# Patient Record
Sex: FEMALE | Race: ASIAN | NOT HISPANIC OR LATINO | Employment: STUDENT | URBAN - METROPOLITAN AREA
[De-identification: names, ages, dates, MRNs, and addresses within clinical notes are randomized per-mention and may not be internally consistent; named-entity substitution may affect disease eponyms.]

---

## 2023-04-17 ENCOUNTER — LAB VISIT (OUTPATIENT)
Dept: LAB | Facility: HOSPITAL | Age: 23
End: 2023-04-17
Attending: NURSE PRACTITIONER
Payer: COMMERCIAL

## 2023-04-17 ENCOUNTER — OFFICE VISIT (OUTPATIENT)
Dept: OBSTETRICS AND GYNECOLOGY | Facility: CLINIC | Age: 23
End: 2023-04-17
Payer: COMMERCIAL

## 2023-04-17 VITALS
DIASTOLIC BLOOD PRESSURE: 78 MMHG | WEIGHT: 131.63 LBS | SYSTOLIC BLOOD PRESSURE: 116 MMHG | HEIGHT: 60 IN | BODY MASS INDEX: 25.84 KG/M2

## 2023-04-17 DIAGNOSIS — R53.83 FATIGUE, UNSPECIFIED TYPE: ICD-10-CM

## 2023-04-17 DIAGNOSIS — Z12.4 ENCOUNTER FOR SCREENING FOR CERVICAL CANCER: ICD-10-CM

## 2023-04-17 DIAGNOSIS — Z01.419 WELL WOMAN EXAM WITH ROUTINE GYNECOLOGICAL EXAM: Primary | ICD-10-CM

## 2023-04-17 DIAGNOSIS — N92.6 IRREGULAR MENSTRUAL CYCLE: ICD-10-CM

## 2023-04-17 LAB
BASOPHILS # BLD AUTO: 0.03 K/UL (ref 0–0.2)
BASOPHILS NFR BLD: 0.5 % (ref 0–1.9)
DIFFERENTIAL METHOD: NORMAL
EOSINOPHIL # BLD AUTO: 0.4 K/UL (ref 0–0.5)
EOSINOPHIL NFR BLD: 7.3 % (ref 0–8)
ERYTHROCYTE [DISTWIDTH] IN BLOOD BY AUTOMATED COUNT: 12 % (ref 11.5–14.5)
HCT VFR BLD AUTO: 42.5 % (ref 37–48.5)
HGB BLD-MCNC: 13.8 G/DL (ref 12–16)
IMM GRANULOCYTES # BLD AUTO: 0.03 K/UL (ref 0–0.04)
IMM GRANULOCYTES NFR BLD AUTO: 0.5 % (ref 0–0.5)
LYMPHOCYTES # BLD AUTO: 1.1 K/UL (ref 1–4.8)
LYMPHOCYTES NFR BLD: 19.3 % (ref 18–48)
MCH RBC QN AUTO: 27.3 PG (ref 27–31)
MCHC RBC AUTO-ENTMCNC: 32.5 G/DL (ref 32–36)
MCV RBC AUTO: 84 FL (ref 82–98)
MONOCYTES # BLD AUTO: 0.3 K/UL (ref 0.3–1)
MONOCYTES NFR BLD: 5.9 % (ref 4–15)
NEUTROPHILS # BLD AUTO: 3.9 K/UL (ref 1.8–7.7)
NEUTROPHILS NFR BLD: 66.5 % (ref 38–73)
NRBC BLD-RTO: 0 /100 WBC
PLATELET # BLD AUTO: 266 K/UL (ref 150–450)
PMV BLD AUTO: 10.7 FL (ref 9.2–12.9)
RBC # BLD AUTO: 5.05 M/UL (ref 4–5.4)
WBC # BLD AUTO: 5.79 K/UL (ref 3.9–12.7)

## 2023-04-17 PROCEDURE — 3008F PR BODY MASS INDEX (BMI) DOCUMENTED: ICD-10-PCS | Mod: CPTII,S$GLB,, | Performed by: NURSE PRACTITIONER

## 2023-04-17 PROCEDURE — 84443 ASSAY THYROID STIM HORMONE: CPT | Performed by: NURSE PRACTITIONER

## 2023-04-17 PROCEDURE — 3078F DIAST BP <80 MM HG: CPT | Mod: CPTII,S$GLB,, | Performed by: NURSE PRACTITIONER

## 2023-04-17 PROCEDURE — 1159F PR MEDICATION LIST DOCUMENTED IN MEDICAL RECORD: ICD-10-PCS | Mod: CPTII,S$GLB,, | Performed by: NURSE PRACTITIONER

## 2023-04-17 PROCEDURE — 3074F PR MOST RECENT SYSTOLIC BLOOD PRESSURE < 130 MM HG: ICD-10-PCS | Mod: CPTII,S$GLB,, | Performed by: NURSE PRACTITIONER

## 2023-04-17 PROCEDURE — 1160F PR REVIEW ALL MEDS BY PRESCRIBER/CLIN PHARMACIST DOCUMENTED: ICD-10-PCS | Mod: CPTII,S$GLB,, | Performed by: NURSE PRACTITIONER

## 2023-04-17 PROCEDURE — 99999 PR PBB SHADOW E&M-NEW PATIENT-LVL III: CPT | Mod: PBBFAC,,, | Performed by: NURSE PRACTITIONER

## 2023-04-17 PROCEDURE — 88175 CYTOPATH C/V AUTO FLUID REDO: CPT | Performed by: NURSE PRACTITIONER

## 2023-04-17 PROCEDURE — 99385 PREV VISIT NEW AGE 18-39: CPT | Mod: S$GLB,,, | Performed by: NURSE PRACTITIONER

## 2023-04-17 PROCEDURE — 99999 PR PBB SHADOW E&M-NEW PATIENT-LVL III: ICD-10-PCS | Mod: PBBFAC,,, | Performed by: NURSE PRACTITIONER

## 2023-04-17 PROCEDURE — 36415 COLL VENOUS BLD VENIPUNCTURE: CPT | Performed by: NURSE PRACTITIONER

## 2023-04-17 PROCEDURE — 1159F MED LIST DOCD IN RCRD: CPT | Mod: CPTII,S$GLB,, | Performed by: NURSE PRACTITIONER

## 2023-04-17 PROCEDURE — 85025 COMPLETE CBC W/AUTO DIFF WBC: CPT | Performed by: NURSE PRACTITIONER

## 2023-04-17 PROCEDURE — 1160F RVW MEDS BY RX/DR IN RCRD: CPT | Mod: CPTII,S$GLB,, | Performed by: NURSE PRACTITIONER

## 2023-04-17 PROCEDURE — 3074F SYST BP LT 130 MM HG: CPT | Mod: CPTII,S$GLB,, | Performed by: NURSE PRACTITIONER

## 2023-04-17 PROCEDURE — 84439 ASSAY OF FREE THYROXINE: CPT | Performed by: NURSE PRACTITIONER

## 2023-04-17 PROCEDURE — 3008F BODY MASS INDEX DOCD: CPT | Mod: CPTII,S$GLB,, | Performed by: NURSE PRACTITIONER

## 2023-04-17 PROCEDURE — 83036 HEMOGLOBIN GLYCOSYLATED A1C: CPT | Performed by: NURSE PRACTITIONER

## 2023-04-17 PROCEDURE — 83525 ASSAY OF INSULIN: CPT | Performed by: NURSE PRACTITIONER

## 2023-04-17 PROCEDURE — 3078F PR MOST RECENT DIASTOLIC BLOOD PRESSURE < 80 MM HG: ICD-10-PCS | Mod: CPTII,S$GLB,, | Performed by: NURSE PRACTITIONER

## 2023-04-17 PROCEDURE — 99385 PR PREVENTIVE VISIT,NEW,18-39: ICD-10-PCS | Mod: S$GLB,,, | Performed by: NURSE PRACTITIONER

## 2023-04-17 RX ORDER — CYANOCOBALAMIN (VITAMIN B-12) 500 MCG
TABLET ORAL
COMMUNITY

## 2023-04-17 RX ORDER — IVERMECTIN 10 MG/G
CREAM TOPICAL
COMMUNITY
Start: 2023-03-27

## 2023-04-17 RX ORDER — ASCORBIC ACID 250 MG
TABLET,CHEWABLE ORAL
COMMUNITY

## 2023-04-17 NOTE — PROGRESS NOTES
Subjective:       Patient ID: Luciana Oneil is a 22 y.o. female.    Chief Complaint:  Annual Exam    Patient's last menstrual period was 2023 (exact date).  History of Present Illness  Annual Exam-Premenopausal  Patient presents for annual exam. The patient is sexually active. GYN screening history: no prior history of gyn screening tests. The patient wears seatbelts: yes. The patient participates in regular exercise: yes. Has the patient ever been transfused or tattooed?: no. The patient reports that there is not domestic violence in her life.  Patient has complaints of increased fatigue over the last year.  Patient notices fatigue worsened significantly after meals.  Patient with long history of irregular menstrual cycles.  Patient reports trial of birth control around 3 years ago but discontinued due to mood changes with use.  Patient reports sometimes cycles are monthly, sometimes skips months between. Declines STD screening.     OB History    Para Term  AB Living   0 0 0 0 0 0   SAB IAB Ectopic Multiple Live Births   0 0 0 0 0       Review of Systems  Review of Systems   Constitutional:  Positive for fatigue. Negative for appetite change, fever and unexpected weight change.   Eyes:  Negative for visual disturbance.   Cardiovascular:  Negative for chest pain.   Gastrointestinal:  Negative for abdominal pain, bloating, constipation, diarrhea, nausea and vomiting.   Genitourinary:  Positive for menstrual problem. Negative for bladder incontinence, dysmenorrhea, dyspareunia, dysuria, flank pain, frequency, genital sores, menorrhagia, pelvic pain, urgency, vaginal bleeding, vaginal discharge, vaginal pain, postcoital bleeding, vaginal dryness and vaginal odor.   Integumentary:  Negative for rash, acne, mole/lesion, breast mass, nipple discharge, breast skin changes and breast tenderness.   Neurological:  Negative for syncope and headaches.   Hematological:  Negative for adenopathy. Does not  bruise/bleed easily.   All other systems reviewed and are negative.  Breast: Positive for breast self exam.Negative for asymmetry, lump, mass, nipple discharge, skin changes and tenderness         Objective:      Physical Exam:   Constitutional: She is oriented to person, place, and time. She appears well-developed and well-nourished.    HENT:   Head: Normocephalic and atraumatic.    Eyes: Pupils are equal, round, and reactive to light. Conjunctivae and EOM are normal.     Cardiovascular:  Normal rate and regular rhythm.             Pulmonary/Chest: Effort normal. Right breast exhibits no inverted nipple, no mass, no nipple discharge, no skin change, no tenderness, no bleeding and no swelling. Left breast exhibits no inverted nipple, no mass, no nipple discharge, no skin change, no tenderness, no bleeding and no swelling. Breasts are symmetrical.        Abdominal: Soft. Hernia confirmed negative in the right inguinal area and confirmed negative in the left inguinal area.     Genitourinary:    Inguinal canal, vagina, uterus, right adnexa, left adnexa and rectum normal.      Pelvic exam was performed with patient supine.   The external female genitalia was normal.   Genitalia hair distrobution normal .   Labial bartholins normal.There is no rash, tenderness, lesion or injury on the right labia. There is no rash, tenderness, lesion or injury on the left labia. Cervix is normal. Right adnexum displays no mass, no tenderness and no fullness. Left adnexum displays no mass, no tenderness and no fullness. No erythema,  no vaginal discharge, bleeding, rectocele, cystocele or unspecified prolapse of vaginal walls in the vagina. Cervix exhibits no motion tenderness and no friability. Uterus is not tender. Normal urethral meatus.          Musculoskeletal: Normal range of motion and moves all extremeties.      Lymphadenopathy: No inguinal adenopathy noted on the right or left side.    Neurological: She is alert and oriented to  person, place, and time.    Skin: Skin is warm and dry. No rash noted. No erythema.    Psychiatric: She has a normal mood and affect. Her behavior is normal. Judgment and thought content normal.          Assessment:     1. Well woman exam with routine gynecological exam    2. Encounter for screening for cervical cancer    3. Fatigue, unspecified type    4. Irregular menstrual cycle              Plan:   Luciana was seen today for annual exam.    Diagnoses and all orders for this visit:    Well woman exam with routine gynecological exam  -     Liquid-Based Pap Smear, Screening    Encounter for screening for cervical cancer  -     Liquid-Based Pap Smear, Screening    Fatigue, unspecified type  -     TSH; Future  -     T4, FREE; Future  -     Hemoglobin A1C; Future  -     Insulin, Random; Future  -     CBC Auto Differential; Future    Irregular menstrual cycle  -     TSH; Future  -     T4, FREE; Future  -     Hemoglobin A1C; Future  -     Insulin, Random; Future  -     CBC Auto Differential; Future      Labs today, will follow.    Discussed use of hormonal contraception for regulation of menstrual cycles.  Patient desires to think about options and consider.  Follow up with me in 1 year for annual well woman exam.

## 2023-04-18 LAB
ESTIMATED AVG GLUCOSE: 100 MG/DL (ref 68–131)
HBA1C MFR BLD: 5.1 % (ref 4–5.6)
INSULIN COLLECTION INTERVAL: NORMAL
INSULIN SERPL-ACNC: 7.7 UU/ML
T4 FREE SERPL-MCNC: 1.07 NG/DL (ref 0.71–1.51)
TSH SERPL DL<=0.005 MIU/L-ACNC: 1.33 UIU/ML (ref 0.4–4)
